# Patient Record
Sex: MALE | Race: BLACK OR AFRICAN AMERICAN | NOT HISPANIC OR LATINO | URBAN - METROPOLITAN AREA
[De-identification: names, ages, dates, MRNs, and addresses within clinical notes are randomized per-mention and may not be internally consistent; named-entity substitution may affect disease eponyms.]

---

## 2024-10-30 ENCOUNTER — TELEPHONE (OUTPATIENT)
Dept: NEUROSURGERY | Facility: CLINIC | Age: 79
End: 2024-10-30
Payer: OTHER GOVERNMENT

## 2024-11-06 ENCOUNTER — TELEPHONE (OUTPATIENT)
Dept: NEUROSURGERY | Facility: CLINIC | Age: 79
End: 2024-11-06
Payer: OTHER GOVERNMENT

## 2024-11-11 ENCOUNTER — TELEPHONE (OUTPATIENT)
Dept: NEUROSURGERY | Facility: CLINIC | Age: 79
End: 2024-11-11
Payer: OTHER GOVERNMENT

## 2024-11-15 ENCOUNTER — TELEPHONE (OUTPATIENT)
Dept: NEUROSURGERY | Facility: CLINIC | Age: 79
End: 2024-11-15
Payer: OTHER GOVERNMENT

## 2024-12-03 ENCOUNTER — TELEPHONE (OUTPATIENT)
Dept: NEUROSURGERY | Facility: CLINIC | Age: 79
End: 2024-12-03
Payer: OTHER GOVERNMENT

## 2024-12-03 ENCOUNTER — OFFICE VISIT (OUTPATIENT)
Dept: NEUROSURGERY | Facility: CLINIC | Age: 79
End: 2024-12-03
Payer: MEDICARE

## 2024-12-03 VITALS — SYSTOLIC BLOOD PRESSURE: 146 MMHG | HEART RATE: 61 BPM | DIASTOLIC BLOOD PRESSURE: 67 MMHG

## 2024-12-03 DIAGNOSIS — M54.2 CERVICALGIA: ICD-10-CM

## 2024-12-03 DIAGNOSIS — I67.1 CEREBRAL ANEURYSM, NONRUPTURED: Primary | ICD-10-CM

## 2024-12-03 DIAGNOSIS — I67.1 CEREBRAL ANEURYSM: Primary | ICD-10-CM

## 2024-12-03 DIAGNOSIS — M54.16 LUMBAR RADICULOPATHY, CHRONIC: ICD-10-CM

## 2024-12-03 PROCEDURE — 99999 PR PBB SHADOW E&M-EST. PATIENT-LVL II: CPT | Mod: PBBFAC,,, | Performed by: NEUROLOGICAL SURGERY

## 2024-12-03 PROCEDURE — 99204 OFFICE O/P NEW MOD 45 MIN: CPT | Mod: S$PBB,,, | Performed by: NEUROLOGICAL SURGERY

## 2024-12-03 PROCEDURE — 99212 OFFICE O/P EST SF 10 MIN: CPT | Mod: PBBFAC | Performed by: NEUROLOGICAL SURGERY

## 2024-12-03 RX ORDER — POTASSIUM CHLORIDE 750 MG/1
20 TABLET, EXTENDED RELEASE ORAL
COMMUNITY
Start: 2024-05-29

## 2024-12-03 RX ORDER — POLYVINYL ALCOHOL 14 MG/ML
SOLUTION/ DROPS OPHTHALMIC
COMMUNITY
Start: 2024-10-21

## 2024-12-03 RX ORDER — MEMANTINE HYDROCHLORIDE 10 MG/1
10 TABLET ORAL
COMMUNITY
Start: 2024-06-12

## 2024-12-03 RX ORDER — VALSARTAN 160 MG/1
1 TABLET ORAL DAILY
COMMUNITY
Start: 2023-12-04

## 2024-12-03 RX ORDER — METFORMIN HYDROCHLORIDE 500 MG/5ML
SOLUTION ORAL
COMMUNITY

## 2024-12-03 RX ORDER — FELODIPINE 10 MG/1
10 TABLET, EXTENDED RELEASE ORAL
COMMUNITY
Start: 2024-07-25

## 2024-12-03 RX ORDER — FLUOCINONIDE TOPICAL SOLUTION USP, 0.05% 0.5 MG/ML
SOLUTION TOPICAL
COMMUNITY
Start: 2024-10-02

## 2024-12-03 RX ORDER — DONEPEZIL HYDROCHLORIDE 10 MG/1
20 TABLET, FILM COATED ORAL
COMMUNITY
Start: 2024-03-13

## 2024-12-03 RX ORDER — INSULIN GLARGINE-YFGN 100 [IU]/ML
INJECTION, SOLUTION SUBCUTANEOUS
COMMUNITY
Start: 2024-05-16

## 2024-12-03 RX ORDER — ASPIRIN 81 MG/1
81 TABLET ORAL
COMMUNITY
Start: 2024-07-22

## 2024-12-03 RX ORDER — IBUPROFEN 200 MG
16 TABLET ORAL
COMMUNITY
Start: 2024-05-16

## 2024-12-03 RX ORDER — LEVOTHYROXINE SODIUM 50 UG/1
0.05 TABLET ORAL
COMMUNITY
Start: 2024-05-29

## 2024-12-03 RX ORDER — DULOXETIN HYDROCHLORIDE 30 MG/1
30 CAPSULE, DELAYED RELEASE ORAL
COMMUNITY
Start: 2024-07-11

## 2024-12-03 RX ORDER — CHOLECALCIFEROL (VITAMIN D3) 50 MCG
50 TABLET ORAL
COMMUNITY
Start: 2023-12-06

## 2024-12-03 RX ORDER — ROSUVASTATIN CALCIUM 40 MG/1
40 TABLET, COATED ORAL
COMMUNITY
Start: 2024-05-29

## 2024-12-03 NOTE — PROGRESS NOTES
Neurosurgery  History & Physical    Scribe Attestation  I, Romana Segal, attest that this documentation has been prepared under the direction and in the presence of Ronel Momin MD.    SUBJECTIVE:     Chief Complaint: aneurysm    History of Present Illness:  Charlie Vaca is an 80 y/o M, PMHx of HTN, HLD, T2DM and Alzheimers, that [facility] asymptomatic left ICA aneurysm seen on CTA. He is accompanied by his daughter. Today he reports feeling well. He has been having daily headaches for the past year, was rx Ubrelvy. Denies nausea, vomiting, numbness, or tingling. No neck pain. He also reports left handed weakness associated with difficulty with DI. Occasionally has back pain but is tolerable. Pt also notes having occasional leg cramps that occur at night when lying in bed. He also complains of constant blurred vision in both eyes. Last eye exam was about 3-4 months ago. Has some difficulty with walking and assisted by a cane. Denies falls. Has had 1-2 incidences of urinary and bowel incontinence during the past year. No other b/b dysfunction. No family hx of aneurysm or stroke. Surgical hx includes inguinal hernia repair. No issues with anesthesia to report.     Review of patient's allergies indicates:  Not on File    No current outpatient medications on file.     No current facility-administered medications for this visit.       No past medical history on file.  No past surgical history on file.  Family History    None       Social History     Socioeconomic History    Marital status:        Review of Systems   Eyes:  Positive for visual disturbance.   Musculoskeletal:  Positive for back pain and gait problem.   Neurological:  Positive for weakness and headaches.   All other systems reviewed and are negative.      OBJECTIVE:     Vital Signs     There is no height or weight on file to calculate BMI.      Neurosurgery Physical Exam  General: no acute distress  Head:  Non-traumatic, normocephalic  Eyes: Pupils equal, EOMI  Neck: Supple, normal ROM, no tenderness to palpation  CVS: Normal rate and rhythm, distal pulses present  Pulm: Symmetric expansion, no respiratory distress  GI: Abdomen nondistended, nontender    MSK: Moves all extremities without restriction, atraumatic  Skin: Dry, intact  Psych: Normal thought content and cognition    Neuro:  Alert, awake, oriented, to self, place, time  Language:  Speech is fluent, goal directed without any noted dysarthria or aphasia    Cranial nerves:    CNII-XII: Intact on fine exam,   Pupils equal round react to light,   Extraocular muscles are intact  V1 to V3 is intact to light touch,   no facial asymmetry,   Hearing is intact to finger rub and voice  tongue/uvula/palate midline,   shoulder shrug equal,     No pronator drift    Extremities:  Motor:  Upper Extremity    Deltoid Triceps Biceps Wrist  Extension Wrist  Flexion Interosseous   R 5/5 5/5 5/5 5/5 5/5 5/5   L 5/5 5/5 5/5 5/5 5/5 5/5       Thumb   Abduction Thumb  ADDuction Finger  Flexion Finger  Extension     R 5/5 5/5 5/5 5/5     L 5/5 5/5 5/5 5/5        Lower Extremity     Iliopsoas Quadriceps Hamstring Plantarflexion Dorsiflexion EHL   R 5/5 5/5 5/5 5/5 5/5 5/5   L 5/5 5/5 5/5 5/5 5/5 5/5       Reflexes:     DTR: 2+ biceps    2+ triceps   2+ brachioradialis   2+ patellar  2+ Achilles     Gilliam's: Negative     Babinski's: Negative     Clonus: Negative     Sensory:      Sensation intact to light touch, temperature sensation, vibration, pinprick     Coordination:      Coordination intact throughout, no dysmetria, normal rapid alternating movements, no dysdiadochokinesia  Cerebellar:  Normal finger-to-nose, normal heel-to-shin  Cervical spine:  No midline cervical tenderness, negative Lhermitte, negative Spurling  Thoracic spine:  No midline thoracic tenderness  Lumbar spine:  No midline lumbar tenderness     Diagnostic Results:  I personally reviewed the patient's Diagnostic  Imaging.     CTA Previous Head;CTA (Neck) 09/27/24  Atherosclerotic disease in cavernous and communicating segment of the ICA on the right and left. Diffuse atherosclerotic changes in vessels.  Stenosis in communicating segment on the left ICA.  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  Mild calcification at the carotid and cervical ICA on both sides. Non-flow limiting    MRI Previous 07/30/24  Global atrophy.  Increased flare signal in bilateral coronal radiata and periventricular space bilaterally with associated cortical volume loss near the right frontal horn and right occipital horn. Overall volume loss in the bilateral occipital lobes.  No acute ischemia on DWI.    Hydrocephalus with dilated temporal horns, third ventricle, and lateral horns.     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    Patient presents with daily headaches, occasional back pain, leg cramps, blurred vision, and difficulty ambulating without cane. Also presents with left hand weakness associated with difficulty with DI.  Discussed imaging results of CT Previous 09/27/24 revealing atherosclerotic disease in cavernous and communicating segment of the ICA on the right and left. Diffuse atherosclerotic changes in vessels. Stenosis in communicating segment on the left ICA. Mild calcification at the carotid and cervical ICA on both sides. Non-flow limiting. 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  Discussed imaging results of MRI Previous 07/30/24 revealing global atrophy and Increased flare signal in bilateral coronal radiata and periventricular space bilaterally with associated cortical volume loss near the right frontal horn and right occipital horn. Overall volume loss in the bilateral occipital lobes. Hydrocephalus with dilated temporal horns,  third ventricle, and lateral horns.  After discussion with the patient, I recommend MRA of the brain with vessel wall imaging and MRI CSF Flow to better investigate radiographic findings of PCOM infundibulum v aneurysm and hydrocephalus. I'd also like to get an MRI of the cervical and lumbar spine. I have answered all of their questions and patient wish to proceed with this plan. We will schedule patient.       Thank you so very much for allowing me to participate in the care of this patient.  Please feel free to call any questions, comments, or concerns.     Ronel Momin MD,MSc  Department of Neurosurgery   Department of Radiology  Department of Neurology  [facility]    [facility]   [employer] [facility] [employer] [facility] [employer]     Total time spent in counseling and discussion about further management options including relevant lab work, treatment,  prognosis, medications and intended side effects was more than 60 minutes. More than 50 % of the time was spent in counseling and coordination of care.  I spent a total of 60 minutes on the day of the visit.This includes face to face time and non-face to face time preparing to see the patient (eg, review of tests), Obtaining and/or reviewing separately obtained history, Documenting clinical information in the electronic or other health record, Independently interpreting resultsand communicating results to the patient/family/caregiver, or Care coordination.     Scribe Attestation  I, Dr. Ronel Momin personally performed the services described in this documentation. All medical record entries made by the scribe, Romana Segal, were at my direction and in my presence.  I have reviewed the chart and agree that the record reflects my personal performance and is accurate and complete.

## 2024-12-04 ENCOUNTER — TELEPHONE (OUTPATIENT)
Dept: NEUROSURGERY | Facility: CLINIC | Age: 79
End: 2024-12-04
Payer: OTHER GOVERNMENT

## 2024-12-04 NOTE — TELEPHONE ENCOUNTER
Called and spoke with pt's spouse. Pt spouse requested the name of the tests to be emailed to her. [facility]. Provided the test 's names. Pt's wife will let us know if they need to have them done here.